# Patient Record
Sex: FEMALE | Race: WHITE | NOT HISPANIC OR LATINO | Employment: FULL TIME | URBAN - NONMETROPOLITAN AREA
[De-identification: names, ages, dates, MRNs, and addresses within clinical notes are randomized per-mention and may not be internally consistent; named-entity substitution may affect disease eponyms.]

---

## 2023-06-01 PROBLEM — E66.3 OVERWEIGHT (BMI 25.0-29.9): Status: ACTIVE | Noted: 2023-06-01

## 2023-06-01 PROBLEM — R03.0 ELEVATED BLOOD PRESSURE READING WITHOUT DIAGNOSIS OF HYPERTENSION: Status: ACTIVE | Noted: 2023-06-01

## 2023-06-01 PROBLEM — R00.2 HEART PALPITATIONS: Status: ACTIVE | Noted: 2023-06-01

## 2023-06-01 PROBLEM — R53.83 FATIGUE: Status: ACTIVE | Noted: 2023-06-01

## 2023-06-01 PROBLEM — E78.5 HYPERLIPIDEMIA: Status: ACTIVE | Noted: 2023-06-01

## 2023-06-01 PROBLEM — I10 HYPERTENSION: Status: ACTIVE | Noted: 2023-06-01

## 2023-06-01 PROBLEM — E78.6 ELEVATED RATIO OF CHOLESTEROL TO HIGH DENSITY LIPOPROTEIN: Status: ACTIVE | Noted: 2023-06-01

## 2023-06-01 PROBLEM — R63.5 WEIGHT GAIN: Status: ACTIVE | Noted: 2023-06-01

## 2023-06-01 PROBLEM — R51.9 HEADACHE: Status: ACTIVE | Noted: 2023-06-01

## 2023-06-02 ENCOUNTER — OFFICE VISIT (OUTPATIENT)
Dept: PRIMARY CARE | Facility: CLINIC | Age: 58
End: 2023-06-02
Payer: COMMERCIAL

## 2023-06-02 ENCOUNTER — LAB (OUTPATIENT)
Dept: LAB | Facility: LAB | Age: 58
End: 2023-06-02
Payer: COMMERCIAL

## 2023-06-02 VITALS
WEIGHT: 170.9 LBS | DIASTOLIC BLOOD PRESSURE: 86 MMHG | OXYGEN SATURATION: 96 % | TEMPERATURE: 98.7 F | SYSTOLIC BLOOD PRESSURE: 124 MMHG | HEART RATE: 89 BPM

## 2023-06-02 DIAGNOSIS — Z12.31 ENCOUNTER FOR SCREENING MAMMOGRAM FOR MALIGNANT NEOPLASM OF BREAST: ICD-10-CM

## 2023-06-02 DIAGNOSIS — K80.20 GALLSTONES: ICD-10-CM

## 2023-06-02 DIAGNOSIS — R10.11 RUQ ABDOMINAL PAIN: ICD-10-CM

## 2023-06-02 DIAGNOSIS — R10.11 RUQ ABDOMINAL PAIN: Primary | ICD-10-CM

## 2023-06-02 PROCEDURE — 1036F TOBACCO NON-USER: CPT | Performed by: FAMILY MEDICINE

## 2023-06-02 PROCEDURE — 3074F SYST BP LT 130 MM HG: CPT | Performed by: FAMILY MEDICINE

## 2023-06-02 PROCEDURE — 3079F DIAST BP 80-89 MM HG: CPT | Performed by: FAMILY MEDICINE

## 2023-06-02 PROCEDURE — 99214 OFFICE O/P EST MOD 30 MIN: CPT | Performed by: FAMILY MEDICINE

## 2023-06-02 PROCEDURE — 80053 COMPREHEN METABOLIC PANEL: CPT

## 2023-06-02 PROCEDURE — 36415 COLL VENOUS BLD VENIPUNCTURE: CPT

## 2023-06-02 PROCEDURE — 85025 COMPLETE CBC W/AUTO DIFF WBC: CPT

## 2023-06-02 RX ORDER — LORATADINE 10 MG/1
10 TABLET ORAL AS NEEDED
COMMUNITY

## 2023-06-02 NOTE — PROGRESS NOTES
Subjective   Patient ID: Rosalie Eddy is a 57 y.o. female who presents for gallbladder attack (5/31/23 night the pain started on right side and pt took pepcid and then vomited. The pain lasted 3 hours. Other symptoms: Headache, sweating, vomiting, pain in right side and arm. On 6/1/23 pt felt pressure in the stomach but no pain today. Similar thing happened in December 2022 and January 2023 and March 2023 with similar symptoms. ).  HPI    Sxs as in nurse note.    Vomiting helps her feel better . No food triggers she identified.  Hx of gallstones (see past CT scan , abd)  . No prior episodes of abd pain. No hx of pud . No hematemesis, hematochezia.     Review of Systems  No night pain , severe pain,  fever, dhills, sweats   Objective   /86 (BP Location: Left arm, Patient Position: Sitting, BP Cuff Size: Adult)   Pulse 89   Temp 37.1 °C (98.7 °F) (Temporal)   Wt 77.5 kg (170 lb 14.4 oz)   SpO2 96%     Physical Exam  Vitals reviewed.   Constitutional:       General: She is not in acute distress.  Cardiovascular:      Rate and Rhythm: Normal rate and regular rhythm.      Heart sounds: Normal heart sounds.   Pulmonary:      Effort: Pulmonary effort is normal.      Breath sounds: No wheezing or rales.   Abdominal:      General: Bowel sounds are normal.      Palpations: Abdomen is soft. There is no mass.      Tenderness: There is no abdominal tenderness. There is no guarding or rebound.   Neurological:      General: No focal deficit present.      Mental Status: She is alert.         Assessment/Plan   Problem List Items Addressed This Visit    None  Visit Diagnoses       RUQ abdominal pain    -  Primary    Relevant Orders    US gallbladder    CBC and Auto Differential    Comprehensive metabolic panel    Gallstones        Relevant Orders    US gallbladder    CBC and Auto Differential    Comprehensive metabolic panel    Encounter for screening mammogram for malignant neoplasm of breast        Relevant Orders      mammo bilateral screening tomosynthesis           Sxic gallstones .  Update imaging, check labs , followup pending results.   Mercy Thompson MD

## 2023-06-03 LAB
ALANINE AMINOTRANSFERASE (SGPT) (U/L) IN SER/PLAS: 252 U/L (ref 7–45)
ALBUMIN (G/DL) IN SER/PLAS: 4.5 G/DL (ref 3.4–5)
ALKALINE PHOSPHATASE (U/L) IN SER/PLAS: 119 U/L (ref 33–110)
ANION GAP IN SER/PLAS: 15 MMOL/L (ref 10–20)
ASPARTATE AMINOTRANSFERASE (SGOT) (U/L) IN SER/PLAS: 136 U/L (ref 9–39)
BASOPHILS (10*3/UL) IN BLOOD BY AUTOMATED COUNT: 0.04 X10E9/L (ref 0–0.1)
BASOPHILS/100 LEUKOCYTES IN BLOOD BY AUTOMATED COUNT: 0.8 % (ref 0–2)
BILIRUBIN TOTAL (MG/DL) IN SER/PLAS: 0.6 MG/DL (ref 0–1.2)
CALCIUM (MG/DL) IN SER/PLAS: 10 MG/DL (ref 8.6–10.6)
CARBON DIOXIDE, TOTAL (MMOL/L) IN SER/PLAS: 27 MMOL/L (ref 21–32)
CHLORIDE (MMOL/L) IN SER/PLAS: 103 MMOL/L (ref 98–107)
CREATININE (MG/DL) IN SER/PLAS: 0.86 MG/DL (ref 0.5–1.05)
EOSINOPHILS (10*3/UL) IN BLOOD BY AUTOMATED COUNT: 0.18 X10E9/L (ref 0–0.7)
EOSINOPHILS/100 LEUKOCYTES IN BLOOD BY AUTOMATED COUNT: 3.6 % (ref 0–6)
ERYTHROCYTE DISTRIBUTION WIDTH (RATIO) BY AUTOMATED COUNT: 14.4 % (ref 11.5–14.5)
ERYTHROCYTE MEAN CORPUSCULAR HEMOGLOBIN CONCENTRATION (G/DL) BY AUTOMATED: 31.1 G/DL (ref 32–36)
ERYTHROCYTE MEAN CORPUSCULAR VOLUME (FL) BY AUTOMATED COUNT: 91 FL (ref 80–100)
ERYTHROCYTES (10*6/UL) IN BLOOD BY AUTOMATED COUNT: 5.53 X10E12/L (ref 4–5.2)
GFR FEMALE: 77 ML/MIN/1.73M2
GLUCOSE (MG/DL) IN SER/PLAS: 92 MG/DL (ref 74–99)
HEMATOCRIT (%) IN BLOOD BY AUTOMATED COUNT: 50.5 % (ref 36–46)
HEMOGLOBIN (G/DL) IN BLOOD: 15.7 G/DL (ref 12–16)
IMMATURE GRANULOCYTES/100 LEUKOCYTES IN BLOOD BY AUTOMATED COUNT: 0.2 % (ref 0–0.9)
LEUKOCYTES (10*3/UL) IN BLOOD BY AUTOMATED COUNT: 5 X10E9/L (ref 4.4–11.3)
LYMPHOCYTES (10*3/UL) IN BLOOD BY AUTOMATED COUNT: 1.05 X10E9/L (ref 1.2–4.8)
LYMPHOCYTES/100 LEUKOCYTES IN BLOOD BY AUTOMATED COUNT: 20.8 % (ref 13–44)
MONOCYTES (10*3/UL) IN BLOOD BY AUTOMATED COUNT: 0.38 X10E9/L (ref 0.1–1)
MONOCYTES/100 LEUKOCYTES IN BLOOD BY AUTOMATED COUNT: 7.5 % (ref 2–10)
NEUTROPHILS (10*3/UL) IN BLOOD BY AUTOMATED COUNT: 3.38 X10E9/L (ref 1.2–7.7)
NEUTROPHILS/100 LEUKOCYTES IN BLOOD BY AUTOMATED COUNT: 67.1 % (ref 40–80)
NRBC (PER 100 WBCS) BY AUTOMATED COUNT: 0 /100 WBC (ref 0–0)
PLATELETS (10*3/UL) IN BLOOD AUTOMATED COUNT: 234 X10E9/L (ref 150–450)
POTASSIUM (MMOL/L) IN SER/PLAS: 4.6 MMOL/L (ref 3.5–5.3)
PROTEIN TOTAL: 7.2 G/DL (ref 6.4–8.2)
SODIUM (MMOL/L) IN SER/PLAS: 140 MMOL/L (ref 136–145)
UREA NITROGEN (MG/DL) IN SER/PLAS: 12 MG/DL (ref 6–23)

## 2023-06-07 DIAGNOSIS — K80.20 CALCULUS OF GALLBLADDER WITHOUT CHOLECYSTITIS WITHOUT OBSTRUCTION: Primary | ICD-10-CM

## 2023-06-07 DIAGNOSIS — K76.0 FATTY LIVER: ICD-10-CM

## 2023-06-07 DIAGNOSIS — R79.89 ABNORMAL LFTS (LIVER FUNCTION TESTS): Primary | ICD-10-CM

## 2023-06-15 ENCOUNTER — LAB (OUTPATIENT)
Dept: LAB | Facility: LAB | Age: 58
End: 2023-06-15
Payer: COMMERCIAL

## 2023-06-15 DIAGNOSIS — R79.89 ABNORMAL LFTS (LIVER FUNCTION TESTS): ICD-10-CM

## 2023-06-15 DIAGNOSIS — K76.0 FATTY LIVER: ICD-10-CM

## 2023-06-15 PROCEDURE — 85610 PROTHROMBIN TIME: CPT

## 2023-06-15 PROCEDURE — 82977 ASSAY OF GGT: CPT

## 2023-06-15 PROCEDURE — 80074 ACUTE HEPATITIS PANEL: CPT

## 2023-06-15 PROCEDURE — 80076 HEPATIC FUNCTION PANEL: CPT

## 2023-06-15 PROCEDURE — 80061 LIPID PANEL: CPT

## 2023-06-15 PROCEDURE — 36415 COLL VENOUS BLD VENIPUNCTURE: CPT

## 2023-06-16 LAB
ALANINE AMINOTRANSFERASE (SGPT) (U/L) IN SER/PLAS: 19 U/L (ref 7–45)
ALBUMIN (G/DL) IN SER/PLAS: 4.8 G/DL (ref 3.4–5)
ALKALINE PHOSPHATASE (U/L) IN SER/PLAS: 82 U/L (ref 33–110)
ASPARTATE AMINOTRANSFERASE (SGOT) (U/L) IN SER/PLAS: 19 U/L (ref 9–39)
BILIRUBIN DIRECT (MG/DL) IN SER/PLAS: 0.1 MG/DL (ref 0–0.3)
BILIRUBIN TOTAL (MG/DL) IN SER/PLAS: 0.4 MG/DL (ref 0–1.2)
CHOLESTEROL (MG/DL) IN SER/PLAS: 248 MG/DL (ref 0–199)
CHOLESTEROL IN HDL (MG/DL) IN SER/PLAS: 51.5 MG/DL
CHOLESTEROL/HDL RATIO: 4.8
GAMMA GLUTAMYL TRANSFERASE (U/L) IN SER/PLAS: 104 U/L (ref 5–55)
HEPATITIS A VIRUS IGM AB PRESENCE IN SER/PLAS BY IMMUNOASSAY: NONREACTIVE
HEPATITIS B VIRUS CORE IGM AB PRESENCE IN SER/PLAS BY IMMUNOASSY: NONREACTIVE
HEPATITIS B VIRUS SURFACE AG PRESENCE IN SERUM: NONREACTIVE
HEPATITIS C VIRUS AB PRESENCE IN SERUM: NONREACTIVE
INR IN PPP BY COAGULATION ASSAY: 1 (ref 0.9–1.1)
LDL: 166 MG/DL (ref 0–99)
PROTEIN TOTAL: 7.3 G/DL (ref 6.4–8.2)
PROTHROMBIN TIME (PT) IN PPP BY COAGULATION ASSAY: 11.5 SEC (ref 9.8–13.4)
TRIGLYCERIDE (MG/DL) IN SER/PLAS: 151 MG/DL (ref 0–149)
VLDL: 30 MG/DL (ref 0–40)

## 2023-06-29 ENCOUNTER — HOSPITAL ENCOUNTER (OUTPATIENT)
Dept: DATA CONVERSION | Facility: HOSPITAL | Age: 58
End: 2023-06-29
Attending: SURGERY | Admitting: SURGERY
Payer: COMMERCIAL

## 2023-06-29 DIAGNOSIS — K80.10 CALCULUS OF GALLBLADDER WITH CHRONIC CHOLECYSTITIS WITHOUT OBSTRUCTION: ICD-10-CM

## 2023-07-21 LAB
COMPLETE PATHOLOGY REPORT: NORMAL
CONVERTED CLINICAL DIAGNOSIS-HISTORY: NORMAL
CONVERTED FINAL DIAGNOSIS: NORMAL
CONVERTED FINAL REPORT PDF LINK TO COPY AND PASTE: NORMAL
CONVERTED GROSS DESCRIPTION: NORMAL
CONVERTED MICROSCOPIC DESCRIPTION: NORMAL

## 2023-08-01 ENCOUNTER — APPOINTMENT (OUTPATIENT)
Dept: PRIMARY CARE | Facility: CLINIC | Age: 58
End: 2023-08-01
Payer: COMMERCIAL

## 2023-08-30 ENCOUNTER — APPOINTMENT (OUTPATIENT)
Dept: PRIMARY CARE | Facility: CLINIC | Age: 58
End: 2023-08-30
Payer: COMMERCIAL

## 2023-09-14 ENCOUNTER — OFFICE VISIT (OUTPATIENT)
Dept: PRIMARY CARE | Facility: CLINIC | Age: 58
End: 2023-09-14
Payer: COMMERCIAL

## 2023-09-14 VITALS
WEIGHT: 169.3 LBS | SYSTOLIC BLOOD PRESSURE: 135 MMHG | TEMPERATURE: 98.2 F | OXYGEN SATURATION: 97 % | HEART RATE: 79 BPM | BODY MASS INDEX: 28.17 KG/M2 | RESPIRATION RATE: 14 BRPM | DIASTOLIC BLOOD PRESSURE: 83 MMHG

## 2023-09-14 DIAGNOSIS — R03.0 ELEVATED BLOOD PRESSURE READING WITHOUT DIAGNOSIS OF HYPERTENSION: ICD-10-CM

## 2023-09-14 DIAGNOSIS — E78.5 HYPERLIPIDEMIA, UNSPECIFIED HYPERLIPIDEMIA TYPE: ICD-10-CM

## 2023-09-14 DIAGNOSIS — R63.5 WEIGHT GAIN: ICD-10-CM

## 2023-09-14 DIAGNOSIS — E66.3 OVERWEIGHT WITH BODY MASS INDEX (BMI) OF 28 TO 28.9 IN ADULT: ICD-10-CM

## 2023-09-14 DIAGNOSIS — Z13.6 SCREENING FOR CARDIOVASCULAR CONDITION: ICD-10-CM

## 2023-09-14 DIAGNOSIS — Z12.11 COLON CANCER SCREENING: ICD-10-CM

## 2023-09-14 DIAGNOSIS — Z00.00 HEALTHCARE MAINTENANCE: Primary | ICD-10-CM

## 2023-09-14 PROCEDURE — 1036F TOBACCO NON-USER: CPT | Performed by: FAMILY MEDICINE

## 2023-09-14 PROCEDURE — 3008F BODY MASS INDEX DOCD: CPT | Performed by: FAMILY MEDICINE

## 2023-09-14 PROCEDURE — 99396 PREV VISIT EST AGE 40-64: CPT | Performed by: FAMILY MEDICINE

## 2023-09-14 RX ORDER — FAMOTIDINE 10 MG/1
TABLET ORAL
COMMUNITY
Start: 2022-09-14

## 2023-09-14 ASSESSMENT — PROMIS GLOBAL HEALTH SCALE
RATE_AVERAGE_FATIGUE: MODERATE
EMOTIONAL_PROBLEMS: RARELY
RATE_SOCIAL_SATISFACTION: EXCELLENT
CARRYOUT_SOCIAL_ACTIVITIES: EXCELLENT
RATE_PHYSICAL_HEALTH: GOOD
RATE_GENERAL_HEALTH: GOOD
RATE_MENTAL_HEALTH: EXCELLENT
RATE_AVERAGE_PAIN: 0
RATE_QUALITY_OF_LIFE: EXCELLENT
CARRYOUT_PHYSICAL_ACTIVITIES: COMPLETELY

## 2023-09-14 NOTE — ASSESSMENT & PLAN NOTE
Vaccines and screenings reviewed.  Questionnaires completed.  Health and wellness topics reviewed.  Diet and exercise recommendations revisited.  Routine blood work reviewed today.     PAP smear is due - can get done with me or GYN if preferred.  If doing pap with PCP, can schedule pap only visit or have done with next physical.    Mammogram previously ordered (6/2023), patient to schedule this.    The 3 Colon Cancer screening tests were reviewed with patient, FIT Test, Colonoscopy and Cologuard. Risks and benefits of each test were discussed. Patient has elected to undergo FIT test. They are aware that this requires consistent completion on annual basis for maximum efficacy (10 years = same statistical outcomes as cscope).    Flu vaccine recommended, defers today.    TDAP UTD per patient, done 2017.    Shingles vaccine (Shingrix) is recommended. Please call insurance to see if covered. This vaccine is expensive but extremely effective. This is to be administered in 2 separate doses, 2- 6 months apart. This is a killed virus vaccine, so no risk of chicken pox or shingles with administration. The vaccine is approximately 90 % effective to protect against shingles even 5 years out. Side effects of the vaccine can include soreness of the arm at administration site and possible flu-like symptoms after administration. This is a strongly recommended vaccine.     LIFESTYLE MEASURES  -FOR SMOOTHIES: recommend Milled flaxseed which can be found over the counter to add to smoothies. (spinach, blueberries, cinnamon, milled flax seed, avocado, half an overripe banana thinned with almond milk or water is a good recipe). Can also add 1-2 scoops of protein.  -make sure you are avoiding refined carbs such as breads, pasta, cereal, candy, soda,  nutrition bars, granola, chips, and sugar sweetened beverages.      -eat 5- 7 servings daily of veggies,  healthy protein such as chicken, fish,  beans, and eggs, and include healthy fats in  your diet such as seeds, nuts, olive oil, avocados, and salmon.   -exercise 4 - 6 days per week as you are able, 150 minutes total weekly divided up is recommended.  -Vitamin D is recommended at 1000 - 5000 IU international units daily.   -Always wear sunscreen when you have sun exposure.  -64 oz of water is recommended daily.  -Dental visits recommended every 6 months.  -Eye exam recommended every 2 years, for those with vision problems every year.

## 2023-09-14 NOTE — ASSESSMENT & PLAN NOTE
6/2023 TC/HDL 4.8, HDL 51.5, , TRIG 151  Goal TC/HDL ratio 3.4 or less, LDL 99 or less,  or less, and TRIG 150 or less.   Lifestyle managed, diet and exercise recommendations revisited.  Given family history of stroke/cardiac disease would consider CACS.    I have ordered a coronary artery calcium score to better determine how to treat your elevated cholesterol. This is a CT scan to determine if you have calcified plaque in your coronary arteries. Coronary artery calcium scores are used to help us determine how to best treat your elevated cholesterol.  The risks of this screen is we may find things that we are not looking for that we will have to follow up on such as lung nodules which are very common in [facility] fatty liver which is common in individuals that are overweight. There is also risk of radiation exposure. Follow up visit will be scheduled to review coronary artery calcium score, if needed. Patient to call or message for order if she wishes to pursue.     Lifestyle measures:  -make sure you are avoiding refined carbs such as breads, pasta, cereal, candy, soda,  nutrition bars, granola, chips, and sugar sweetened beverages.      -eat 5- 7 servings daily of veggies,  healthy protein such as chicken, fish,  beans, and eggs, and include healthy fats in your diet such as seeds, nuts, olive oil, avocados, and salmon.   -exercise 4 - 6 days per week as you are able, 150 minutes total weekly divided up is recommended.  -consider taking the fiber product psyllium capsules or powder 2 times daily (generic brand is fine) , or a brand name psyllium such as [place] [facility] or Metamucil.  -consider also adding plant stanols and sterols such as Nature Made CholestOff, available over the counter.

## 2023-09-14 NOTE — PROGRESS NOTES
Subjective   Patient ID: Rosalie Eddy is a 58 y.o. female who presents for Annual Exam (Pt presents for annual physical exam- ABN given to pt and signed, pt verbalized understanding. Pt states no concerns at this time. ).    HPI     Patient presents today for annual physical.  Patient is doing well overall, they have no new concerns or issues.     WELLNESS VISIT   TDAP: none found - 2017 per patient  SHINGRIX: none found  PNEUMOVAX: N/A  PAP: none found - 2016/2017 per patient  MAMMO: 4/2019 - ordered 6/2023  CSCOPE: none found  DEXA: N/A  HEP C SCREEN: 6/2023 negative on hepatitis panel  CACS: none found  LIPID: 6/2023 TC/HDL 4.8, HDL 51.5, , TRIG 151    S/p cholecystectomy 6/29/2023 via Dr. Christianson  Feels much better since having this done, wonders why she waited so long.  BM about 2x per day, no diarrhea, overall habits improved.    Diet: nutrition not great the past month due to prior gallbladder issues. She notes that she wants to lose weight, plans to eat more veggies and less cheese. She has used Noom in past with success, was able to get down to 150-155 lbs with this. Typically has smoothie for breakfast, salad at lunch, eats dinner with .    Exercise: walking 30 minutes per day, indoors. Will walk outdoors when in [place] for month of October. Plans to start some strength training.    Alcohol use: occasional, 1-2 times a month  Smoking: never smoker    Patient declines influenza vaccine.  Patient to think about the Shingrix vaccine, defers today.  Patient reports TDAP vaccine was given last in 2017.    FMHx:  Dad with hx of hypotension.  Mom with hx of stroke, HTN, lung CA (was smoker), Alzheimer's  Brother with a-fib (hx of alcohol use, smoker)    Breast cancer screening:   Denies family history in first degree relative.  Denies lumps/bumps, skin changes, nipple retraction, or nipple drainage.    Cervical cancer screening:   She is not following with GYN currently.  Last pap in 2016/2017 per  patient.  Denies prior hx of abnormal paps.  Denies family history in first degree relative.  Denies pelvic pain, vaginal discharge, or vaginal bleeding.    Bone density screening:   Denies family history in first degree relative.  Patient is/is not supplementing calcium or vitamin D.    Colon cancer screening:   Denies family history in first degree relative.  Denies melena, hematochezia, constipation, diarrhea, bloating, change in bowel habits.    Thyroid disorder screening:   Denies family history in first degree relative.  Denies heat or cold intolerance, diarrhea or constipation, coarsening of hair, and palpitations.     Cardiac disorder screening:   Family history mom with stroke.  Denies chest pain, SOB, palpitations, edema, dizziness.     CHRONIC CONDITIONS:  -Palpitations  Felt to be related to allergy med per patient in 2019 5/2019 Holter Monitor showed sinus rhythm with 6 episodes of supraventricular tachycardia (SVT). Overall results reassuring.    -HLD  Lifestyle managed  FMHx of mom and brother with stroke (both smokers)  No CACS in our records.  6/2023 TC/HDL 4.8, HDL 51.5, , TRIG 151    Patient denies any facial droop, sudden vision loss, weakness or numbness on one side of body.   Patient denies chest pain, shortness of breath with exertion, palpitations, or symptoms of claudication.     Review of Systems   All other systems reviewed and are negative.      Objective   /83 (BP Location: Left arm, Patient Position: Sitting, BP Cuff Size: Small adult)   Pulse 79   Temp 36.8 °C (98.2 °F) (Temporal)   Resp 14   Wt 76.8 kg (169 lb 4.8 oz)   SpO2 97%   BMI 28.17 kg/m²     Physical Exam  Vitals and nursing note reviewed.   Constitutional:       General: She is not in acute distress.     Appearance: Normal appearance. She is not toxic-appearing.   HENT:      Head: Normocephalic and atraumatic.   Eyes:      Extraocular Movements: Extraocular movements intact.      Pupils: Pupils are equal,  round, and reactive to light.   Neck:      Thyroid: No thyromegaly.   Cardiovascular:      Rate and Rhythm: Normal rate and regular rhythm.      Heart sounds: No murmur heard.     No friction rub. No gallop.   Pulmonary:      Effort: Pulmonary effort is normal.      Breath sounds: Normal breath sounds. No wheezing, rhonchi or rales.   Abdominal:      General: Bowel sounds are normal. There is no distension.      Palpations: Abdomen is soft. There is no mass.      Tenderness: There is no abdominal tenderness. There is no guarding.   Musculoskeletal:      Right lower leg: No edema.      Left lower leg: No edema.   Lymphadenopathy:      Cervical: No cervical adenopathy.   Skin:     General: Skin is warm and dry.   Neurological:      General: No focal deficit present.      Mental Status: She is alert and oriented to person, place, and time.   Psychiatric:         Mood and Affect: Mood normal.         Behavior: Behavior normal.         Assessment/Plan   Problem List Items Addressed This Visit       Elevated blood pressure reading without diagnosis of hypertension     BP is 135/83 in office today, mildly elevated.  Goal BP is 130/80 or less, ideally 120/80 or less.     Lifestyle managed, patient plans to work on nutrition and activity, feels that her BP will normalize with weight reduction.  Diet and exercise recommendations revisited.     Patient is encouraged to continue low sodium diet (Dietary Approach to Stop Hypertension, or DASH diet) .   Exercise most days of the week.   Limit refined carbohydrates such as pretzels, cereals, breads, pastries, alcohol.    If patient wishes to try a natural approach to lowering blood pressure, can consider:  -whey protein 20 -30 g daily (hydrolyzed is best, but any is ok)  -aged garlic 600 mg twice daily (Kyolic brand aged garlic on line is one example)   -CoQ10 supplement at 120 mg - 360 mg daily (average dose studied 225mg daily).          Hyperlipidemia     6/2023 TC/HDL 4.8, HDL  51.5, , TRIG 151  Goal TC/HDL ratio 3.4 or less, LDL 99 or less,  or less, and TRIG 150 or less.   Lifestyle managed, diet and exercise recommendations revisited.  Given family history of stroke/cardiac disease would consider CACS.    I have ordered a coronary artery calcium score to better determine how to treat your elevated cholesterol. This is a CT scan to determine if you have calcified plaque in your coronary arteries. Coronary artery calcium scores are used to help us determine how to best treat your elevated cholesterol.  The risks of this screen is we may find things that we are not looking for that we will have to follow up on such as lung nodules which are very common in [facility] fatty liver which is common in individuals that are overweight. There is also risk of radiation exposure. Follow up visit will be scheduled to review coronary artery calcium score, if needed. Patient to call or message for order if she wishes to pursue.     Lifestyle measures:  -make sure you are avoiding refined carbs such as breads, pasta, cereal, candy, soda,  nutrition bars, granola, chips, and sugar sweetened beverages.      -eat 5- 7 servings daily of veggies,  healthy protein such as chicken, fish,  beans, and eggs, and include healthy fats in your diet such as seeds, nuts, olive oil, avocados, and salmon.   -exercise 4 - 6 days per week as you are able, 150 minutes total weekly divided up is recommended.  -consider taking the fiber product psyllium capsules or powder 2 times daily (generic brand is fine) , or a brand name psyllium such as [place] [facility] or Metamucil.  -consider also adding plant stanols and sterols such as Nature Made CholestOff, available over the counter.          Overweight with body mass index (BMI) of 28 to 28.9 in adult    Weight gain    Fatty liver    Healthcare maintenance - Primary     Vaccines and screenings reviewed.  Questionnaires completed.  Health and wellness topics  reviewed.  Diet and exercise recommendations revisited.  Routine blood work reviewed today.     PAP smear is due - can get done with me or GYN if preferred.  If doing pap with PCP, can schedule pap only visit or have done with next physical.    Mammogram previously ordered (6/2023), patient to schedule this.    The 3 Colon Cancer screening tests were reviewed with patient, FIT Test, Colonoscopy and Cologuard. Risks and benefits of each test were discussed. Patient has elected to undergo FIT test. They are aware that this requires consistent completion on annual basis for maximum efficacy (10 years = same statistical outcomes as cscope).    Flu vaccine recommended, defers today.    TDAP UTD per patient, done 2017.    Shingles vaccine (Shingrix) is recommended. Please call insurance to see if covered. This vaccine is expensive but extremely effective. This is to be administered in 2 separate doses, 2- 6 months apart. This is a killed virus vaccine, so no risk of chicken pox or shingles with administration. The vaccine is approximately 90 % effective to protect against shingles even 5 years out. Side effects of the vaccine can include soreness of the arm at administration site and possible flu-like symptoms after administration. This is a strongly recommended vaccine.     LIFESTYLE MEASURES  -FOR SMOOTHIES: recommend Milled flaxseed which can be found over the counter to add to smoothies. (spinach, blueberries, cinnamon, milled flax seed, avocado, half an overripe banana thinned with almond milk or water is a good recipe). Can also add 1-2 scoops of protein.  -make sure you are avoiding refined carbs such as breads, pasta, cereal, candy, soda,  nutrition bars, granola, chips, and sugar sweetened beverages.      -eat 5- 7 servings daily of veggies,  healthy protein such as chicken, fish,  beans, and eggs, and include healthy fats in your diet such as seeds, nuts, olive oil, avocados, and salmon.   -exercise 4 - 6 days  per week as you are able, 150 minutes total weekly divided up is recommended.  -Vitamin D is recommended at 1000 - 5000 IU international units daily.   -Always wear sunscreen when you have sun exposure.  -64 oz of water is recommended daily.  -Dental visits recommended every 6 months.  -Eye exam recommended every 2 years, for those with vision problems every year.            Other Visit Diagnoses       Colon cancer screening        Screening for cardiovascular condition                Follow-up in 6 months for recheck blood pressure and lifestyle changes, review CACS.  Labs to be done prior.  Call for sooner follow-up if needed.     Scribe Attestation  By signing my name below, IKeyla Scribe   attest that this documentation has been prepared under the direction and in the presence of Ana Chowdhury DO.

## 2023-09-14 NOTE — ASSESSMENT & PLAN NOTE
BP is 135/83 in office today, mildly elevated.  Goal BP is 130/80 or less, ideally 120/80 or less.     Lifestyle managed, patient plans to work on nutrition and activity, feels that her BP will normalize with weight reduction.  Diet and exercise recommendations revisited.     Patient is encouraged to continue low sodium diet (Dietary Approach to Stop Hypertension, or DASH diet) .   Exercise most days of the week.   Limit refined carbohydrates such as pretzels, cereals, breads, pastries, alcohol.    If patient wishes to try a natural approach to lowering blood pressure, can consider:  -whey protein 20 -30 g daily (hydrolyzed is best, but any is ok)  -aged garlic 600 mg twice daily (Kyolic brand aged garlic on line is one example)   -CoQ10 supplement at 120 mg - 360 mg daily (average dose studied 225mg daily).

## 2023-09-29 VITALS — WEIGHT: 170.42 LBS | BODY MASS INDEX: 28.39 KG/M2 | HEIGHT: 65 IN

## 2023-09-30 NOTE — H&P
"    History & Physical Reviewed:   Pregnant/Lactating:  ·  Are You Pregnant no (1)   ·  Are You Currently Breastfeeding no (1)     I have reviewed the History and Physical dated:  29-Jun-2023   History and Physical reviewed and relevant findings noted. Patient examined to review pertinent physical  findings.: No significant changes   Home Medications Reviewed: no changes noted   Allergies Reviewed: no changes noted       ERAS (Enhanced Recovery After Surgery):  ·  ERAS Patient: no     Consent:   COVID-19 Consent:  ·  COVID-19 Risk Consent Surgeon has reviewed key risks related to the risk of daniel COVID-19 and if they contract COVID-19 what the risks are.       Electronic Signatures:  Gustavo Christianson)  (Signed 29-Jun-2023 11:30)   Authored: History & Physical Reviewed, ERAS, Consent,  Note Completion      Last Updated: 29-Jun-2023 11:30 by Gustavo Christianson)    References:  1.  Data Referenced From \"Patient Profile - Preop v3\" 28-Jun-2023 12:34   "

## 2023-10-02 NOTE — OP NOTE
Post Operative Note:     PreOp Diagnosis: Symptomatic cholelithiasis and chronic  cholecystitis   Post-Procedure Diagnosis: Symptomatic cholelithiasis  and chronic cholecystitis   Procedure: Laparoscopic cholecystectomy   Surgeon: Gustavo Christianson MD   Resident/Fellow/Other Assistant: Wilfred Whitney SA   Anesthesia: GETA   I.V. Fluids: 1100 CC   Estimated Blood Loss (mL): Less than 20 cc   Specimen: yes. See below   Complications: None   Findings: See below     Operative Report Dictated:  Dictation: not applicable - note contains Operative  Report   Operative Report:    Findings:    There were fatty adhesions to the body and neck of the gallbladder; the gallbladder otherwise appeared normal in size color and shape; there was an impacted gallstone at the distal neck proximal cystic duct; the anatomy of the vale hepatis was normal  otherwise    Specimens:  gallbladder    Procedure: The patient was taken to the operating room and placed on the table in the supine position. Satisfactory general endotracheal  anesthesia was achieved. The abdomen was prepared and draped in the normal sterile fashion. After the appropriate timeout, the case commenced.    A 2-3 centimeter curvilinear infraumbilical transverse incision was made and the skin and subcutaneous tissues were divided down to the intra-umbilical fascia, which was elevated. A 10 mm transverse incision was made in infraumbilical fascia and access  was obtained to the peritoneal cavity. The Baxter trocar was placed through this fascial defect and secured with the balloon. Pneumoperitoneum was achieved the normal fashion. The patient was placed in reverse Trendelenburg position and tilted to the  left. Under direct vision, 5 mm right subcostal trochars were placed in the subxiphoid, midclavicular, and anterior axillary line positions.    The above findings were noted. The gallbladder was grasped at the fundus via the lateral port and retracted anteriorly and  superiorly.  The fatty adhesions to the body and neck of the gallbladder were taken down sharply, using minimal electrocautery where  necessary.  The infundibulum or Annmarie's pouch was grasped via the middle port and retracted anterolaterally and inferiorly, exposing the structures of the triangle of Calot. Using careful and meticulous dissection, the cystic duct and cystic artery  were identified and dissected free.  A clear posterior critical view of safety was obtained. The cystic duct was then clipped once proximally and 3 times distally and divided. More posteriorly, the cystic artery was clipped twice proximally and once distally  and divided. The gallbladder was then removed from its bed using electrocautery in a retrograde fashion. The gallbladder was placed in an Endopouch and removed through the infraumbilical fascial defect without difficulty and sent to pathology as a specimen.    The Baxter trocar was replaced, pneumoperitoneum re-achieved and exposure re-obtained. The gallbladder bed was inspected and copiously irrigated and suctioned. In addition, the subhepatic space and right gutter irrigated and suctioned. The gallbladder  bed was dry. There was no evidence of bleeding or bile leak. The irrigant was clear.    Under direct vision, the trochars were removed with no evidence of bleeding. The pneumoperitoneum was evacuated. The infra umbilical fascial defect was approximated using interrupted figure-of-eight 0 Vicryl sutures. Subcutaneous tissues at the site was  approximated with interrupted 3-0 Vicryl sutures. The skin at each incision was then approximated using running 4-0 undyed subcuticular Vicryl sutures.  The wounds were cleaned and dried and benzoin and Steri-Strips were placed followed by dry sterile  Bioclusive dressings.    The patient tolerated the procedure well. Sponge needle and instrument counts were correct times 2. Total IVF were 1100 cc crystalloid. Blood loss was minimal and less  than 20 cc. The patient was extubated in the operating room and taken to the recovery  room with stable vital signs and in excellent condition.    Gustavo Christianson M.D.    Attestation:   Note Completion:  Attending Attestation I performed the procedure without a resident          Electronic Signatures:  Gustavo Christianson)  (Signed 29-Jun-2023 12:50)   Authored: Post Operative Note, Note Completion      Last Updated: 29-Jun-2023 12:50 by Gustavo Christianson)

## 2023-11-13 ENCOUNTER — APPOINTMENT (OUTPATIENT)
Dept: RADIOLOGY | Facility: CLINIC | Age: 58
End: 2023-11-13
Payer: COMMERCIAL

## 2024-01-25 PROCEDURE — 87086 URINE CULTURE/COLONY COUNT: CPT

## 2024-01-26 ENCOUNTER — LAB REQUISITION (OUTPATIENT)
Dept: LAB | Facility: HOSPITAL | Age: 59
End: 2024-01-26
Payer: COMMERCIAL

## 2024-01-27 LAB — BACTERIA UR CULT: NORMAL

## 2024-03-14 ENCOUNTER — APPOINTMENT (OUTPATIENT)
Dept: PRIMARY CARE | Facility: CLINIC | Age: 59
End: 2024-03-14
Payer: COMMERCIAL

## 2024-06-25 ENCOUNTER — APPOINTMENT (OUTPATIENT)
Dept: PRIMARY CARE | Facility: CLINIC | Age: 59
End: 2024-06-25
Payer: COMMERCIAL

## 2024-09-10 ENCOUNTER — APPOINTMENT (OUTPATIENT)
Dept: PRIMARY CARE | Facility: CLINIC | Age: 59
End: 2024-09-10
Payer: COMMERCIAL

## 2025-01-23 ENCOUNTER — APPOINTMENT (OUTPATIENT)
Dept: PRIMARY CARE | Facility: CLINIC | Age: 60
End: 2025-01-23
Payer: COMMERCIAL

## 2025-08-25 ENCOUNTER — OFFICE VISIT (OUTPATIENT)
Dept: URGENT CARE | Age: 60
End: 2025-08-25
Payer: COMMERCIAL

## 2025-08-25 DIAGNOSIS — U07.1 COVID: Primary | ICD-10-CM

## 2025-08-25 PROCEDURE — 99213 OFFICE O/P EST LOW 20 MIN: CPT | Performed by: REGISTERED NURSE

## 2025-08-25 RX ORDER — NIRMATRELVIR AND RITONAVIR 300-100 MG
3 KIT ORAL 2 TIMES DAILY
Qty: 30 TABLET | Refills: 0 | Status: SHIPPED | OUTPATIENT
Start: 2025-08-25 | End: 2025-08-30

## 2025-08-25 RX ORDER — NIRMATRELVIR AND RITONAVIR 300-100 MG
3 KIT ORAL 2 TIMES DAILY
Qty: 30 TABLET | Refills: 0 | Status: SHIPPED | OUTPATIENT
Start: 2025-08-25 | End: 2025-08-25